# Patient Record
Sex: MALE | Race: OTHER | HISPANIC OR LATINO | URBAN - METROPOLITAN AREA
[De-identification: names, ages, dates, MRNs, and addresses within clinical notes are randomized per-mention and may not be internally consistent; named-entity substitution may affect disease eponyms.]

---

## 2022-08-23 ENCOUNTER — EMERGENCY (EMERGENCY)
Facility: HOSPITAL | Age: 27
LOS: 1 days | Discharge: HOME | End: 2022-08-23
Attending: EMERGENCY MEDICINE | Admitting: EMERGENCY MEDICINE

## 2022-08-23 VITALS
TEMPERATURE: 98 F | DIASTOLIC BLOOD PRESSURE: 87 MMHG | HEART RATE: 90 BPM | OXYGEN SATURATION: 99 % | SYSTOLIC BLOOD PRESSURE: 139 MMHG | RESPIRATION RATE: 979 BRPM

## 2022-08-23 PROCEDURE — 99284 EMERGENCY DEPT VISIT MOD MDM: CPT

## 2022-08-23 PROCEDURE — 71046 X-RAY EXAM CHEST 2 VIEWS: CPT | Mod: 26

## 2022-08-23 PROCEDURE — 71100 X-RAY EXAM RIBS UNI 2 VIEWS: CPT | Mod: 26,RT

## 2022-08-23 RX ORDER — ACETAMINOPHEN 500 MG
975 TABLET ORAL ONCE
Refills: 0 | Status: COMPLETED | OUTPATIENT
Start: 2022-08-23 | End: 2022-08-23

## 2022-08-23 RX ADMIN — Medication 975 MILLIGRAM(S): at 23:19
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
## 2022-08-23 NOTE — ED PROVIDER NOTE - NS ED ATTENDING STATEMENT MOD
Attending Only This was a shared visit with the [facility]. I reviewed and verified the documentation and independently performed the documented:
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
## 2022-08-23 NOTE — ED PROVIDER NOTE - PHYSICAL EXAMINATION
Well appearing NAD   NCAT  perrl 2-12 intact  neck- no cspine tenderness, FROM  mild tenderness right trapezius   heart s1s2, lungs clear  right rlateral rib tenderness, no crepitation   abd soft nontender,   no CVA tenderness   left arm and leg, FROM motor intact, sensation and pulse intact  right arm FROM, shoulder and elbow  right wrist tender to palpation  little finger decreased ROM, tender to palpation and tender 5th MC   cap refil nml nml radial pulse

## 2022-08-23 NOTE — ED PROVIDER NOTE - PATIENT PORTAL LINK FT
You can access the FollowMyHealth Patient Portal offered by [facility] by registering at the following website: [email or web address]. By joining [employer]’s FollowMyHealth portal, you will also be able to view your health information using other applications (apps) compatible with our system.
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
## 2022-08-23 NOTE — ED ADULT TRIAGE NOTE - BP NONINVASIVE SYSTOLIC (MM HG)
Dr Santiago - Patient seen Friday for follow up; we were reviewing several different issues but he seems to have more difficulty with memory than before -- didn't remember that his vision changes have been present since the stroke, also was concerned that a chronic rash was new. I asked him to check in with you about this -- let me know if you feel he should be seen sooner for this or if he should see neuropsych for more formal evaluation. Thanks,Tyler Jasmine MD
FYI - patient seen Friday, they had decreased the lasix dose back to 60mg and were concerned about increase due to his CKD; they want to work on controlling sodium in diet better. Referred to nutrition. 
139

## 2022-08-24 PROCEDURE — 73100 X-RAY EXAM OF WRIST: CPT | Mod: 26,RT

## 2022-08-24 PROCEDURE — 73120 X-RAY EXAM OF HAND: CPT | Mod: 26,RT
